# Patient Record
Sex: MALE | URBAN - METROPOLITAN AREA
[De-identification: names, ages, dates, MRNs, and addresses within clinical notes are randomized per-mention and may not be internally consistent; named-entity substitution may affect disease eponyms.]

---

## 2023-05-02 ENCOUNTER — TELEPHONE (OUTPATIENT)
Dept: SURGERY | Facility: CLINIC | Age: 53
End: 2023-05-02

## 2023-05-02 NOTE — TELEPHONE ENCOUNTER
Received a 26 page fax from [facility] for Dr. Angeles Stephenson. Confirmed patient's name and  with medical records. Different addresses and phone numbers. Sent fax to medical records for scanning.